# Patient Record
(demographics unavailable — no encounter records)

---

## 2024-10-07 NOTE — HISTORY OF PRESENT ILLNESS
[FreeTextEntry1] : 41yo  27w4d ARCELIA: 24 by embryo transfer here for transfer of care. Previously seeing Lee Ann Boothe who moved to [place], and would like to continue care with a provider who delivers at [facility]. Pregnancy complicated by obesity, AMA, hypothyroidism, GBS bacteruria, abnormal NIPT initially, repeated and normal, and likely pre-gestational DM. Fingersticks have been WNL, not on medication, following with endocrinology. Last appointment a1c 5.5, and TSH WNL. Records and sonogram reviewed.   OB: [id number]  PMHx: hypothyroidsm, obesity Sx: denies NKDA Meds: aspirin, PNV, synthroid 50mcg

## 2025-02-12 NOTE — HISTORY OF PRESENT ILLNESS
[Postpartum Follow Up] : postpartum follow up [Delivery Date: ___] : on [unfilled] [Female] : Delivery History: baby girl [Wt. ___] : weighing [unfilled] [BF with Difficulty] : nursing with difficulty [Complications:___] : complications include: [unfilled] [] : delivered by vaginal delivery [Intended Contraception] : Intended Contraception: [Natural Family Planning] : natural family planning [Breast Pain] : no breast pain [S/Sx PP Depression] : signs/symptoms of postpartum depression [[place] Depression Scale ___ (0-30)] : [unfilled] [Clean/Dry/Intact] : clean, dry and intact [Healed] : healed [Back to Normal] : is back to normal in size [Cervix Sample Taken] : cervical sample not taken for a Pap smear [Doing Well] : is doing well [None] : None [de-identified] : Doing well, no restrictions, had endocrinology follow up next week for thyroid and DM.

## 2025-07-11 NOTE — PLAN
[FreeTextEntry1] : #Pelvic pain  - Discussed broad ddx for pelvic pain which includes GYN, , GI, MSK origin - Upreg today - TVUS - Vaginitis/STI testing today - Rec symptom diary   30 min spent obtaining history, counseling patient, documenting visit  RTC to review symptom diary and imaging results

## 2025-07-11 NOTE — PHYSICAL EXAM
[MA] : MA [Appropriately responsive] : appropriately responsive [Alert] : alert [No Acute Distress] : no acute distress [Soft] : soft [Non-tender] : non-tender [Non-distended] : non-distended [Oriented x3] : oriented x3 [Labia Majora] : normal [Labia Minora] : normal [Normal] : normal [Uterine Adnexae] : normal [FreeTextEntry2] : Radha